# Patient Record
Sex: MALE | Race: ASIAN | NOT HISPANIC OR LATINO | URBAN - METROPOLITAN AREA
[De-identification: names, ages, dates, MRNs, and addresses within clinical notes are randomized per-mention and may not be internally consistent; named-entity substitution may affect disease eponyms.]

---

## 2021-03-04 ENCOUNTER — TELEPHONE (OUTPATIENT)
Dept: UROLOGY | Facility: MEDICAL CENTER | Age: 26
End: 2021-03-04

## 2021-03-04 NOTE — TELEPHONE ENCOUNTER
Complaint/Diagnosis: frequent urination    Insurance: [employer]     History of Cancer:no    Previous urologist:no    Outside testing/where:no    If yes,what kind:no     Records requested/where:no    Preferred location: [place]     Patient is exchange student   Is calling insurance to see if we are in network as well

## 2021-12-02 ENCOUNTER — TELEPHONE (OUTPATIENT)
Dept: NEPHROLOGY | Facility: CLINIC | Age: 26
End: 2021-12-02

## 2022-02-23 ENCOUNTER — TELEPHONE (OUTPATIENT)
Dept: NEUROLOGY | Facility: CLINIC | Age: 27
End: 2022-02-23

## 2022-02-23 NOTE — TELEPHONE ENCOUNTER
Called and left a voicemail for patient via  services - Please call back to confirm upcoming appointment with Dr Alanna Johnson  Provided patient with apt date, time and location  Informed patient that check in is at least 15 minutes prior to apt time

## 2022-03-02 NOTE — TELEPHONE ENCOUNTER
Called and spoke to patient - he requested to cancel apt as he is going to a different neurologist  per pt, he no longer needs the apt

## 2023-06-02 RX ORDER — LORATADINE 10 MG/1
10 TABLET ORAL DAILY
COMMUNITY
Start: 2022-12-22 | End: 2023-06-05

## 2023-06-05 ENCOUNTER — OFFICE VISIT (OUTPATIENT)
Dept: NEUROLOGY | Facility: CLINIC | Age: 28
End: 2023-06-05
Payer: COMMERCIAL

## 2023-06-05 VITALS
HEART RATE: 83 BPM | OXYGEN SATURATION: 96 % | DIASTOLIC BLOOD PRESSURE: 66 MMHG | WEIGHT: 145.6 LBS | HEIGHT: 66 IN | SYSTOLIC BLOOD PRESSURE: 120 MMHG | BODY MASS INDEX: 23.4 KG/M2 | TEMPERATURE: 98.2 F

## 2023-06-05 DIAGNOSIS — G44.89 HEADACHE SYNDROME: Primary | ICD-10-CM

## 2023-06-05 PROCEDURE — 99204 OFFICE O/P NEW MOD 45 MIN: CPT | Performed by: STUDENT IN AN ORGANIZED HEALTH CARE EDUCATION/TRAINING PROGRAM

## 2023-06-05 NOTE — PROGRESS NOTES
[address] [facility] Consult  PATIENT:  Fatimah Kaye  MRN:  [id number]  :  1995  DATE OF SERVICE:  2023  REFERRED BY: Self, Referral  PMD: Romana Rock, MD    Assessment/Plan:     Fatimah Kaye is a delightful 34 y o  male with a past medical history that includes allergies referred here for evaluation of headache  Initial evaluation 2023     Mr Eber Powell reports a history of headaches that started around 15years old  Earlier this year he had a flare-up of his headaches, but attributes that to an erratic sleep schedule associated with classes  Currently, his headaches have improved a bit in terms of intensity and frequency, but he continues to experience about 10 headache days per month  The description of his headache is a little vague  He has some migrainous features to it with regards to the length of time that the headaches have lasted in the past, but he denies any other migrainous features  Nonetheless, I have recommended that he try magnesium and B2 supplements to see if they help  He was very concerned about having a brain tumor, but I reassured him that based on his normal neurological exam as well as normal MRI that I am not worried about this  At his follow-up visit, we can try to dive further into autonomic symptoms and see if they are associated with his headache at all  Headache syndrome    Workup:  - Neurologic assessment reveals unremarkable neurological exam   - MRI brain without contrast 2022: No acute intracranial findings  No white matter changes      Preventative:  - we discussed headache hygiene and lifestyle factors that may improve headaches  - Mg and B2  - Currently on through other providers: None  - Past/ failed/contraindicated: None  - future options: Topamax, TCA/SNRI, CGRP med, botox    Acute:  - discussed not taking over-the-counter or prescription pain medications more than 2-3 days per week to prevent medication overuse/rebound headache  - Currently on through other providers: None  - Past/ failed/contraindicated: None  - future options:  Triptan, prochlorperazine, Toradol IM or p o , could consider trial of 5 days of Depakote 500 mg nightly or dexamethasone 2 mg daily for prolonged migraine, anmol Sullivan nurtec  Patient instructions   Headache Calendar  Please maintain a headache calendar  Consider using phone applications such as Migraine Deandre or Citizen of [place] Migraine Tracker    Headache/migraine treatment:   Acute medications (for immediate treatment of a headache): It is ok to take ibuprofen, acetaminophen or naproxen (Advil, Tylenol,  Aleve, Excedrin) if they help your headaches you should limit these to No more than 2-3 times a week to avoid medication overuse/rebound headaches  Over the counter preventive supplements for headaches/migraines (if you try, try for 3 months straight)  (to take every day to help prevent headaches - not to take at the time of headache):  [x] Magnesium 400mg daily (If any diarrhea or upset stomach, decrease dose  as tolerated) - oxide or glycinate  [x] Riboflavin (Vitamin B2) 400mg daily - (FYI B2 may make your urine bright/neon yellow)    Lifestyle Recommendations:  [x] SLEEP - Maintain a regular sleep schedule: Adults need at least 7-8 hours of uninterrupted a night  Maintain good sleep hygiene:  Going to bed and waking up at consistent times, avoiding excessive daytime naps, avoiding caffeinated beverages in the evening, avoid excessive stimulation in the evening and generally using bed primarily for sleeping  One hour before bedtime would recommend turning lights down lower, decreasing your activity (may read quietly, listen to music at a low volume)  When you get into bed, should eliminate all technology (no texting, emailing, playing with your phone, iPad or tablet in bed)  [x] HYDRATION - Maintain good hydration    Drink  2L of fluid a day (4 typical small water bottles)  [x] DIET - Maintain good nutrition  In particular don't skip meals and try and eat healthy balanced meals regularly  [x] TRIGGERS - Look for other triggers and avoid them: Limit caffeine to 1-2 cups a day or less  Avoid dietary triggers that you have noticed bring on your headaches (this could include aged cheese, peanuts, MSG, aspartame and nitrates)  [x] EXERCISE - physical exercise as we all know is good for you in many ways, and not only is good for your heart, but also is beneficial for your mental health, cognitive health and  chronic pain/headaches  I would encourage at the least 5 days of physical exercise weekly for at least 30 minutes  Education and Follow-up  [x] Please call with any questions or concerns  Of course if any new concerning symptoms go to the emergency department  [x] Follow up in 6 months  CC: We had the pleasure of evaluating Loma Duane in neurological consultation today  Loma Duane is a   right handed male who presents today for evaluation of headaches  History obtained from patient as well as available medical record review  History of Present Illness:   Current medical illnesses  or past medical history include allergies    Pertinent history:  - Worse from January to March of this year (of note, around this time his sleep schedule was irregular due to classes)    Headaches started at what age? 15years old  How often do the headaches occur?   - as of 6/5/2023: 15/30 earlier this year; currently, 10/30 (of those, none are severe)  What time of the day do the headaches start? No particular time of day   How long do the headaches last? 1-2 days if severe enough in the past, but currently only minutes  In one day, he can have 10 episodes  Are you ever headache free? Yes    Aura? without aura     Where is your headache located and pain quality? Left occipital; throbbing  What is the intensity of pain?  Worst 8/10, Average: 4-5/10  Associated symptoms:   [x] Stiff or sore neck   [x] Tinnitus Things that make the headache worse? In the past, physical activity would make it worse    Headache triggers:  None    Have you seen someone else for headaches or pain? No  Have you had trigger point injection performed and how often? No  Have you had Botox injection performed and how often? No   Have you had epidural injections or transforaminal injections performed? No  Have you ever had any Brain imaging? yes MRI    Last eye exam: 10 years ago    What medications do you take or have you taken for your headaches? ABORTIVE:    OTC medications: None  Prescription: None    Past/ failed/contraindicated:  OTC medications: None  Prescription: None    PREVENTIVE:   None    Past/ failed/contraindicated:  None      LIFESTYLE  Sleep   - averages: about 7-8 hours per night  Problems falling asleep?:   Yes, sometimes  Problems staying asleep?:  No    Physical activity: [place]     Water: 5 cups per day  Caffeine: 1-2 cups of tea per day    Mood:   Denies history of anxiety or depression or other diagnosed mood disorder    The following portions of the patient's history were reviewed and updated as appropriate: allergies, current medications, past family history, past medical history, past social history, past surgical history and problem list     Pertinent family history:  Family history of headaches:  no known family members with significant headaches  Any family history of aneurysms - No    Pertinent social history:  Work: Research assistant  Education: PhD student  Lives with roommate    Illicit Drugs: denies  Alcohol/tobacco: Denies alcohol use, Denies tobacco use  Past Medical History:   History reviewed  No pertinent past medical history  There is no problem list on file for this patient  Medications:      No current outpatient medications on file  No current facility-administered medications for this visit  Allergies:       Allergies   Allergen Reactions   • Permethrin Other (See Comments) "Dust mite       Family History:     History reviewed  No pertinent family history  Social History:       Social History     Socioeconomic History   • Marital status: Single     Spouse name: Not on file   • Number of children: Not on file   • Years of education: Not on file   • Highest education level: Not on file   Occupational History   • Not on file   Tobacco Use   • Smoking status: Never   • Smokeless tobacco: Never   Vaping Use   • Vaping Use: Never used   Substance and Sexual Activity   • Alcohol use: Never   • Drug use: Never   • Sexual activity: Not on file   Other Topics Concern   • Not on file   Social History Narrative   • Not on file     Social Determinants of Health     Financial Resource Strain: Not on file   Food Insecurity: Not on file   Transportation Needs: Not on file   Physical Activity: Not on file   Stress: Not on file   Social Connections: Not on file   Intimate Partner Violence: Not on file   Housing Stability: Not on file         Objective:   Physical Exam:                                                               Vitals:            Constitutional:  /66 (BP Location: Left arm, Patient Position: Sitting, Cuff Size: Large)   Pulse 83   Temp 98 2 °F (36 8 °C) (Temporal)   Ht 5' 5 75\" (1 67 m)   Wt 66 kg (145 lb 9 6 oz)   SpO2 96%   BMI 23 68 kg/m²   BP Readings from Last 3 Encounters:   06/05/23 120/66     Pulse Readings from Last 3 Encounters:   06/05/23 83         Well developed, well nourished, well groomed  No dysmorphic features  HEENT:  Normocephalic atraumatic  Oropharynx is clear and moist  No oral mucosal lesions  Chest:  Respirations regular and unlabored  Cardiovascular:  Distal extremities warm without palpable edema or tenderness, no observed significant swelling  Musculoskeletal:  (see below under neurologic exam for evaluation of motor function and gait)   Skin:  warm and dry, not diaphoretic   No apparent birthmarks or stigmata of neurocutaneous " disease  Psychiatric:  Normal behavior and appropriate affect       Neurological Examination:     Mental status/cognitive function:   Orientated to time, place and person  Recent and remote memory intact  Attention span and concentration as well as fund of knowledge are appropriate for age  Normal language and spontaneous speech  Cranial Nerves:  II-visual fields full  Fundi poorly visualized due to pupillary constriction  III, IV, VI-Pupils were equal, round, and reactive to light and accomodation  Extraocular movements were full and conjugate without nystagmus  Conjugate gaze, normal smooth pursuits, normal saccades   V-facial sensation symmetric  VII-facial expression symmetric, intact forehead wrinkle, strong eye closure, symmetric smile    VIII-hearing grossly intact bilaterally   IX, X-palate elevation symmetric, no dysarthria  XI-shoulder shrug strength intact    XII-tongue protrusion midline  Motor Exam: symmetric bulk and tone throughout, no pronator drift  Power/strength 5/5 bilateral upper and lower extremities, no atrophy, fasciculations or abnormal movements noted  Sensory: grossly intact light touch in all extremities  Reflexes: brachioradialis 2+, biceps 2+, knee 2+, ankle 2+ bilaterally  No ankle clonus  Coordination: Finger nose finger intact bilaterally, no apparent dysmetria, ataxia or tremor noted  Gait: steady casual and tandem gait  Pertinent lab results: None     Pertinent Imaging:   -MRI brain without contrast July 2022: No acute intracranial findings  No white matter changes  I have personally reviewed imaging and radiology read  Review of Systems:   Constitutional: Negative  Negative for appetite change and fever  HENT: Negative  Negative for hearing loss, tinnitus, trouble swallowing and voice change  Eyes: Negative  Negative for photophobia, pain and visual disturbance  Respiratory: Negative  Negative for shortness of breath      Cardiovascular: Negative  Negative for palpitations  Gastrointestinal: Negative  Negative for nausea and vomiting  Endocrine: Negative  Negative for cold intolerance  Genitourinary: Negative  Negative for dysuria, frequency and urgency  Musculoskeletal: Negative  Negative for gait problem, myalgias and neck pain  Skin: Negative  Negative for rash  Allergic/Immunologic: Negative  Neurological: Negative for dizziness, tremors, seizures, syncope, facial asymmetry, speech difficulty, weakness, light-headedness, numbness and headaches  Hematological: Negative  Does not bruise/bleed easily  Psychiatric/Behavioral: Negative  Negative for confusion, hallucinations and sleep disturbance  All other systems reviewed and are negative  I have spent 40 minutes with the patient today in which greater than 50% of this time was spent in counseling/coordination of care regarding Diagnostic results, Prognosis, Risks and benefits of tx options, Patient and family education, Impressions, Documenting in the medical record, Reviewing / ordering tests, medicine, procedures   and Obtaining or reviewing history    I also spent 15 minutes non face to face for this patient the same day       Activity Minutes   Precharting/reviewing 10   Patient care/counseling 40   Postcharting/care coordination 5       Author:  Ru Ya DO 6/5/2023 1:53 PM

## 2023-06-05 NOTE — PROGRESS NOTES
Review of Systems   Constitutional: Negative  Negative for appetite change and fever  HENT: Negative  Negative for hearing loss, tinnitus, trouble swallowing and voice change  Eyes: Negative  Negative for photophobia, pain and visual disturbance  Respiratory: Negative  Negative for shortness of breath  Cardiovascular: Negative  Negative for palpitations  Gastrointestinal: Negative  Negative for nausea and vomiting  Endocrine: Negative  Negative for cold intolerance  Genitourinary: Negative  Negative for dysuria, frequency and urgency  Musculoskeletal: Negative  Negative for gait problem, myalgias and neck pain  Skin: Negative  Negative for rash  Allergic/Immunologic: Negative  Neurological: Negative for dizziness, tremors, seizures, syncope, facial asymmetry, speech difficulty, weakness, light-headedness, numbness and headaches  Hematological: Negative  Does not bruise/bleed easily  Psychiatric/Behavioral: Negative  Negative for confusion, hallucinations and sleep disturbance  All other systems reviewed and are negative

## 2023-06-05 NOTE — PATIENT INSTRUCTIONS
Headache Calendar  Please maintain a headache calendar  Consider using phone applications such as Migraine Deandre or [place] Migraine Tracker    Headache/migraine treatment:   Acute medications (for immediate treatment of a headache): It is ok to take ibuprofen, acetaminophen or naproxen (Advil, Tylenol,  Aleve, Excedrin) if they help your headaches you should limit these to No more than 2-3 times a week to avoid medication overuse/rebound headaches  Over the counter preventive supplements for headaches/migraines (if you try, try for 3 months straight)  (to take every day to help prevent headaches - not to take at the time of headache):  [x] Magnesium 400mg daily (If any diarrhea or upset stomach, decrease dose  as tolerated) - oxide or glycinate  [x] Riboflavin (Vitamin B2) 400mg daily - (FYI B2 may make your urine bright/neon yellow)    Lifestyle Recommendations:  [x] SLEEP - Maintain a regular sleep schedule: Adults need at least 7-8 hours of uninterrupted a night  Maintain good sleep hygiene:  Going to bed and waking up at consistent times, avoiding excessive daytime naps, avoiding caffeinated beverages in the evening, avoid excessive stimulation in the evening and generally using bed primarily for sleeping  One hour before bedtime would recommend turning lights down lower, decreasing your activity (may read quietly, listen to music at a low volume)  When you get into bed, should eliminate all technology (no texting, emailing, playing with your phone, iPad or tablet in bed)  [x] HYDRATION - Maintain good hydration  Drink  2L of fluid a day (4 typical small water bottles)  [x] DIET - Maintain good nutrition  In particular don't skip meals and try and eat healthy balanced meals regularly  [x] TRIGGERS - Look for other triggers and avoid them: Limit caffeine to 1-2 cups a day or less   Avoid dietary triggers that you have noticed bring on your headaches (this could include aged cheese, peanuts, MSG, aspartame and nitrates)  [x] EXERCISE - physical exercise as we all know is good for you in many ways, and not only is good for your heart, but also is beneficial for your mental health, cognitive health and  chronic pain/headaches  I would encourage at the least 5 days of physical exercise weekly for at least 30 minutes  Education and Follow-up  [x] Please call with any questions or concerns  Of course if any new concerning symptoms go to the emergency department    [x] Follow up in 6 months

## 2023-11-30 ENCOUNTER — TELEPHONE (OUTPATIENT)
Dept: NEUROLOGY | Facility: CLINIC | Age: 28
End: 2023-11-30

## 2024-04-16 ENCOUNTER — OFFICE VISIT (OUTPATIENT)
Dept: PULMONOLOGY | Facility: CLINIC | Age: 29
End: 2024-04-16
Payer: COMMERCIAL

## 2024-04-16 VITALS
HEART RATE: 60 BPM | SYSTOLIC BLOOD PRESSURE: 112 MMHG | DIASTOLIC BLOOD PRESSURE: 60 MMHG | TEMPERATURE: 97.5 F | OXYGEN SATURATION: 98 %

## 2024-04-16 DIAGNOSIS — J45.991 COUGH VARIANT ASTHMA: ICD-10-CM

## 2024-04-16 DIAGNOSIS — R05.3 CHRONIC COUGH: Primary | ICD-10-CM

## 2024-04-16 PROCEDURE — 95012 NITRIC OXIDE EXP GAS DETER: CPT | Performed by: INTERNAL MEDICINE

## 2024-04-16 PROCEDURE — 99204 OFFICE O/P NEW MOD 45 MIN: CPT | Performed by: INTERNAL MEDICINE

## 2024-04-16 RX ORDER — FLUTICASONE PROPIONATE 250 UG/1
1 POWDER, METERED RESPIRATORY (INHALATION) 2 TIMES DAILY
Qty: 60 BLISTER | Refills: 5 | Status: SHIPPED | OUTPATIENT
Start: 2024-04-16 | End: 2024-10-13

## 2024-04-16 RX ORDER — PREDNISONE 20 MG/1
40 TABLET ORAL DAILY
Qty: 10 TABLET | Refills: 0 | Status: SHIPPED | OUTPATIENT
Start: 2024-04-16

## 2024-04-16 RX ORDER — AZELASTINE HYDROCHLORIDE 137 UG/1
SPRAY, METERED NASAL
COMMUNITY
Start: 2024-03-20

## 2024-04-16 RX ORDER — FLUTICASONE PROPIONATE 50 MCG
2 SPRAY, SUSPENSION (ML) NASAL DAILY
COMMUNITY
Start: 2024-03-20

## 2024-04-16 NOTE — PROGRESS NOTES
Assessment/Plan:    Cough variant asthma  I am convinced that Layne's cough is due to cough variant asthma.  His FeNO is 65 indicative of significant airway inflammation.  He also has an allergen panel which shows significant allergies to cats and he does own a cat that sleeps in the bed with him.  His cough is worse at night despite Allegra and Flonase.  Have given him 5 days of prednisone as he is a forced expiratory wheeze on today's exam.  In addition to that I started him on Flovent 250, 1 puff twice daily and instructed him on proper usage.  Also asked him to check with the pharmacist on proper inhaler use once he gets his inhaler.  Will bring him back in 2 months to assess his response to therapy     Diagnoses and all orders for this visit:    Chronic cough  -     POCT FeNO  -     predniSONE 20 mg tablet; Take 2 tablets (40 mg total) by mouth daily  -     fluticasone (Flovent Diskus) 250 mcg/actuation diskus inhaler; Inhale 1 puff 2 (two) times a day Rinse mouth after use.    Cough variant asthma    Other orders  -     Azelastine HCl 137 MCG/SPRAY SOLN; 1-2 SPRAYS INTO EACH NOSTRIL 2 (TWO) TIMES A DAY.  -     fluticasone (FLONASE) 50 mcg/act nasal spray; 2 sprays into each nostril daily          Subjective:      Patient ID: Layne Partida is a 29 y.o. male.    Layne is here for evaluation of a cough.  He says a cough for 6 to 12 months worse at night when he is trying to fall asleep and in the morning he coughs up phlegm.  He denies any shortness of breath or wheezing he lives at home with 1 cat and denies smoking or vaping.  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  He does admit to nasal congestion and allergies.        The following portions of the patient's history were reviewed and updated as appropriate: allergies, current medications, past family history, past medical history, past social history, past surgical history, and problem list.    Review of Systems   Respiratory:  Positive for cough.           Objective:      /60 (BP Location: Left arm, Patient Position: Sitting, Cuff Size: Standard)   Pulse 60   Temp 97.5 °F (36.4 °C) (Tympanic)   SpO2 98%          Physical Exam  Constitutional:       Appearance: He is well-developed.   HENT:      Head: Normocephalic.   Eyes:      Pupils: Pupils are equal, round, and reactive to light.   Cardiovascular:      Rate and Rhythm: Normal rate.      Heart sounds: No murmur heard.  Pulmonary:      Effort: Pulmonary effort is normal. No respiratory distress.      Breath sounds: Normal breath sounds. No wheezing or rales.   Abdominal:      Palpations: Abdomen is soft.   Musculoskeletal:         General: Normal range of motion.      Cervical back: Normal range of motion and neck supple.   Skin:     General: Skin is warm and dry.   Neurological:      Mental Status: He is alert and oriented to person, place, and time.

## 2024-04-16 NOTE — PATIENT INSTRUCTIONS
Continue Nasal spray and allegra  Start Prednisone 2 pills a day for 5 days  Take Flovent 1 puff twice a day (rinse gargle and apit after each use)

## 2024-04-16 NOTE — ASSESSMENT & PLAN NOTE
I am convinced that Layne's cough is due to cough variant asthma.  His FeNO is 65 indicative of significant airway inflammation.  He also has an allergen panel which shows significant allergies to cats and he does own a cat that sleeps in the bed with him.  His cough is worse at night despite Allegra and Flonase.  Have given him 5 days of prednisone as he is a forced expiratory wheeze on today's exam.  In addition to that I started him on Flovent 250, 1 puff twice daily and instructed him on proper usage.  Also asked him to check with the pharmacist on proper inhaler use once he gets his inhaler.  Will bring him back in 2 months to assess his response to therapy

## 2024-05-08 DIAGNOSIS — R05.3 CHRONIC COUGH: ICD-10-CM

## 2024-05-08 RX ORDER — FLUTICASONE PROPIONATE 250 UG/1
1 POWDER, METERED RESPIRATORY (INHALATION) 2 TIMES DAILY
Qty: 60 BLISTER | Refills: 5 | Status: CANCELLED | OUTPATIENT
Start: 2024-05-08 | End: 2024-11-04

## 2024-05-08 NOTE — TELEPHONE ENCOUNTER
----- Message from Layne Partida sent at 5/8/2024 12:36 AM EDT -----  Regarding: re-subscription  Contact: [phone]  MD Elba Jasso, the fluticasone 250mcg diskus almost ran out. Can you help me to reorder it in [facility]?

## 2024-05-09 NOTE — TELEPHONE ENCOUNTER
Requested medication(s) are due for refill today: Yes  Patient has already received a courtesy refill: No  Other reason request has been forwarded to provider: protocol failed

## 2024-05-10 DIAGNOSIS — R05.3 CHRONIC COUGH: ICD-10-CM

## 2024-05-10 RX ORDER — FLUTICASONE PROPIONATE 250 UG/1
1 POWDER, METERED RESPIRATORY (INHALATION) 2 TIMES DAILY
Qty: 60 BLISTER | Refills: 5 | Status: SHIPPED | OUTPATIENT
Start: 2024-05-10 | End: 2024-11-06

## 2024-06-07 ENCOUNTER — OFFICE VISIT (OUTPATIENT)
Dept: PULMONOLOGY | Facility: CLINIC | Age: 29
End: 2024-06-07
Payer: COMMERCIAL

## 2024-06-07 VITALS
OXYGEN SATURATION: 98 % | SYSTOLIC BLOOD PRESSURE: 100 MMHG | HEIGHT: 78 IN | HEART RATE: 94 BPM | WEIGHT: 160 LBS | BODY MASS INDEX: 18.51 KG/M2 | TEMPERATURE: 98.8 F | DIASTOLIC BLOOD PRESSURE: 70 MMHG

## 2024-06-07 DIAGNOSIS — J45.991 COUGH VARIANT ASTHMA: Primary | ICD-10-CM

## 2024-06-07 PROCEDURE — 95012 NITRIC OXIDE EXP GAS DETER: CPT | Performed by: INTERNAL MEDICINE

## 2024-06-07 PROCEDURE — 99215 OFFICE O/P EST HI 40 MIN: CPT | Performed by: INTERNAL MEDICINE

## 2024-06-07 RX ORDER — FLUTICASONE PROPIONATE AND SALMETEROL 500; 50 UG/1; UG/1
1 POWDER RESPIRATORY (INHALATION) 2 TIMES DAILY
Qty: 60 BLISTER | Refills: 5 | Status: SHIPPED | OUTPATIENT
Start: 2024-06-07

## 2024-06-07 NOTE — PROGRESS NOTES
Ambulatory Visit  Name: Layne Partida      : 1995      MRN: [id number]  Encounter Provider: Elba Villanueva DO  Encounter Date: 2024   Encounter department: [facility]    Assessment & Plan   1. Cough variant asthma  Assessment & Plan:  Patient's FeNO is dramatically improved today down to 18 from 65.  That being said he still coughing and wheezing and I think we should escalate his treatment from an inhaled corticosteroid to Wixela 1 puff twice a day.  He certainly worse off having a Particularly Asleep in bed with him as he is highly allergic.  I encouraged him to continue taking the Allegra and Flonase escalate his Wixela and he is looking to relocate his cat.  Back in 3 months to assess response to treatment.  Orders:  -     POCT FeNO  -     Fluticasone-Salmeterol (Wixela Inhub) 500-50 mcg/dose inhaler; Inhale 1 puff 2 (two) times a day Rinse mouth after use.      History of Present Illness     Layne Partida is a 29 y.o. male who presents for follow-up of his symptoms.  He is doing much better especially after the prednisone taper he is taking the Flovent every day still has cough and wheeze occasionally.  The cough is persistent.    Review of Systems   Constitutional: Negative.    HENT: Negative.     Eyes: Negative.    Respiratory:  Positive for shortness of breath and wheezing.    Cardiovascular: Negative.    Gastrointestinal: Negative.    Endocrine: Negative.    Genitourinary: Negative.    Allergic/Immunologic: Negative.    Neurological: Negative.    Hematological: Negative.    Psychiatric/Behavioral: Negative.       Medical History Reviewed by provider this encounter:       Current Outpatient Medications on File Prior to Visit   Medication Sig Dispense Refill    Azelastine HCl 137 MCG/SPRAY SOLN 1-2 SPRAYS INTO EACH NOSTRIL 2 (TWO) TIMES A DAY.      fluticasone (FLONASE) 50 mcg/act nasal spray 2 sprays into each nostril daily      fluticasone (Flovent Diskus) 250  "mcg/actuation diskus inhaler Inhale 1 puff 2 (two) times a day Rinse mouth after use. 60 blister 5    ciclopirox (PENLAC) 8 % solution Apply topically      predniSONE 20 mg tablet Take 2 tablets (40 mg total) by mouth daily (Patient not taking: Reported on 6/7/2024) 10 tablet 0     No current facility-administered medications on file prior to visit.      Social History     Tobacco Use    Smoking status: Never    Smokeless tobacco: Never   Vaping Use    Vaping status: Never Used   Substance and Sexual Activity    Alcohol use: Never    Drug use: Never    Sexual activity: Not on file     Objective     /70   Pulse 94   Temp 98.8 °F (37.1 °C)   Ht 6' 7\" (2.007 m)   Wt 72.6 kg (160 lb)   SpO2 98%   BMI 18.02 kg/m²     Physical Exam  Constitutional:       Appearance: He is well-developed.   HENT:      Head: Normocephalic and atraumatic.   Eyes:      Pupils: Pupils are equal, round, and reactive to light.   Cardiovascular:      Rate and Rhythm: Normal rate and regular rhythm.      Heart sounds: No murmur heard.  Pulmonary:      Effort: Pulmonary effort is normal. No respiratory distress.      Breath sounds: Normal breath sounds. No wheezing or rales.   Abdominal:      Palpations: Abdomen is soft.   Musculoskeletal:      Cervical back: Normal range of motion and neck supple.   Skin:     General: Skin is warm and dry.   Neurological:      Mental Status: He is alert and oriented to person, place, and time.       Administrative Statements           "

## 2024-06-07 NOTE — ASSESSMENT & PLAN NOTE
Patient's FeNO is dramatically improved today down to 18 from 65.  That being said he still coughing and wheezing and I think we should escalate his treatment from an inhaled corticosteroid to Wixela 1 puff twice a day.  He certainly worse off having a Particularly Asleep in bed with him as he is highly allergic.  I encouraged him to continue taking the Allegra and Flonase escalate his Wixela and he is looking to relocate his cat.  Back in 3 months to assess response to treatment.

## 2024-09-19 ENCOUNTER — TELEPHONE (OUTPATIENT)
Dept: PULMONOLOGY | Facility: CLINIC | Age: 29
End: 2024-09-19

## 2024-09-19 NOTE — TELEPHONE ENCOUNTER
Called patient to schedule appointment for the  3M FU ( Around September 1, 2024) from the Recall List and left a voicemail message.

## 2025-05-08 ENCOUNTER — OFFICE VISIT (OUTPATIENT)
Dept: PULMONOLOGY | Facility: CLINIC | Age: 30
End: 2025-05-08
Payer: COMMERCIAL

## 2025-05-08 VITALS
TEMPERATURE: 96.7 F | DIASTOLIC BLOOD PRESSURE: 68 MMHG | HEIGHT: 67 IN | BODY MASS INDEX: 25.74 KG/M2 | OXYGEN SATURATION: 98 % | WEIGHT: 164 LBS | SYSTOLIC BLOOD PRESSURE: 102 MMHG | HEART RATE: 63 BPM

## 2025-05-08 DIAGNOSIS — J45.991 COUGH VARIANT ASTHMA: ICD-10-CM

## 2025-05-08 PROCEDURE — 99214 OFFICE O/P EST MOD 30 MIN: CPT | Performed by: INTERNAL MEDICINE

## 2025-05-08 RX ORDER — FLUTICASONE PROPIONATE AND SALMETEROL 250; 50 UG/1; UG/1
1 POWDER RESPIRATORY (INHALATION) 2 TIMES DAILY
Qty: 60 BLISTER | Refills: 5 | Status: SHIPPED | OUTPATIENT
Start: 2025-05-08

## 2025-05-08 RX ORDER — FEXOFENADINE HCL 180 MG/1
180 TABLET ORAL DAILY
COMMUNITY

## 2025-05-08 NOTE — PROGRESS NOTES
"Name: Layne Partida      : 1995      MRN: [id number]  Encounter Provider: Elba Villanueva DO  Encounter Date: 2025   Encounter department: [facility]  :  Assessment & Plan  Cough variant asthma  Patient is doing well as long as he takes his Wixela.  He trialed off of it and had return of his wheeze and cough.  Now he is back to taking 500 twice a day with no symptoms.  Since he got rid of his cat we will de-escalate him to 250 twice a day and return in 6 months if he does not move to [place].    Orders:    Fluticasone-Salmeterol (Wixela Inhub) 250-50 mcg/dose inhaler; Inhale 1 puff 2 (two) times a day Rinse mouth after use.        History of Present Illness   HPI  Layne Partida is a 30 y.o. male who presents for follow-up of his asthma.  He trialed off Advair and had return of his symptoms.  He is back to taking it twice daily.  History obtained from: patient    Review of Systems   Constitutional: Negative.    HENT: Negative.     Eyes: Negative.    Respiratory:  Negative for shortness of breath.    Cardiovascular: Negative.    Gastrointestinal: Negative.    Endocrine: Negative.    Genitourinary: Negative.    Allergic/Immunologic: Negative.    Neurological: Negative.    Hematological: Negative.    Psychiatric/Behavioral: Negative.       Medical History Reviewed by provider this encounter:     .     Objective   /68   Pulse 63   Temp (!) 96.7 °F (35.9 °C) (Tympanic)   Ht 5' 7\" (1.702 m)   Wt 74.4 kg (164 lb)   SpO2 98%   BMI 25.69 kg/m²      Physical Exam  Constitutional:       Appearance: He is well-developed.   HENT:      Head: Normocephalic and atraumatic.   Eyes:      Pupils: Pupils are equal, round, and reactive to light.   Cardiovascular:      Rate and Rhythm: Normal rate and regular rhythm.      Heart sounds: No murmur heard.  Pulmonary:      Effort: Pulmonary effort is normal. No respiratory distress.      Breath sounds: Normal breath sounds. No wheezing or " rales.   Abdominal:      Palpations: Abdomen is soft.   Musculoskeletal:      Cervical back: Normal range of motion and neck supple.   Skin:     General: Skin is warm and dry.   Neurological:      Mental Status: He is alert and oriented to person, place, and time.         Administrative Statements   I have spent a total time of 30 minutes in caring for this patient on the day of the visit/encounter including Diagnostic results, Prognosis, Counseling / Coordination of care, Documenting in the medical record, and Reviewing/placing orders in the medical record (including tests, medications, and/or procedures).

## 2025-05-08 NOTE — ASSESSMENT & PLAN NOTE
Patient is doing well as long as he takes his Wixela.  He trialed off of it and had return of his wheeze and cough.  Now he is back to taking 500 twice a day with no symptoms.  Since he got rid of his cat we will de-escalate him to 250 twice a day and return in 6 months if he does not move to [place].    Orders:    Fluticasone-Salmeterol (Wixela Inhub) 250-50 mcg/dose inhaler; Inhale 1 puff 2 (two) times a day Rinse mouth after use.